# Patient Record
Sex: FEMALE | Race: AMERICAN INDIAN OR ALASKA NATIVE
[De-identification: names, ages, dates, MRNs, and addresses within clinical notes are randomized per-mention and may not be internally consistent; named-entity substitution may affect disease eponyms.]

---

## 2018-06-22 ENCOUNTER — HOSPITAL ENCOUNTER (OUTPATIENT)
Dept: HOSPITAL 5 - SPVWC | Age: 68
Discharge: HOME | End: 2018-06-22
Attending: FAMILY MEDICINE
Payer: COMMERCIAL

## 2018-06-22 DIAGNOSIS — Z13.820: Primary | ICD-10-CM

## 2018-06-22 DIAGNOSIS — F17.210: ICD-10-CM

## 2018-06-22 DIAGNOSIS — Z90.710: ICD-10-CM

## 2018-06-22 DIAGNOSIS — E13.9: ICD-10-CM

## 2018-06-22 DIAGNOSIS — Z82.49: ICD-10-CM

## 2018-06-22 DIAGNOSIS — Z78.0: ICD-10-CM

## 2018-06-22 DIAGNOSIS — I10: ICD-10-CM

## 2018-06-22 DIAGNOSIS — Z83.3: ICD-10-CM

## 2018-06-22 DIAGNOSIS — E78.00: ICD-10-CM

## 2018-06-22 DIAGNOSIS — E07.9: ICD-10-CM

## 2018-06-22 PROCEDURE — 77080 DXA BONE DENSITY AXIAL: CPT

## 2018-06-22 NOTE — MAMMOGRAPHY REPORT
BONE DENSITY STUDY:



Postmenopausal osteoporosis screening.



DEFINITIONS:

  BMD     = Bone Mineral Density

  T-score = BMD related to mean peak bone mass of young adult

            (mean expressed in Standard Deviation)

  Z-score = Age matched BMD expressed in SD



World Health Organization (WHO) Diagnostic Criteria

  Normal             T-score > -1 SD

  Osteopenia      T-score between -1 and -2.4 SD

  Osteoporosis    T-score -2.5 SD or below



FINDINGS:

The weighted average BMD of lumbar spine L1-L4 is 1.199 with a T-score 

of 0.4.



The weighted average BMD of the left hip is 0.926 with a T-score of 

-0.7. The femoral neck BMD is 0.760 with a T. value score of -1.3.



Compared to her prior examination in 2015 there is generalized 

improvement in the bone density of both the lumbar spine and left hip.



IMPRESSION:

The patient's average T-score is diagnostic for normal bone density and

low relative risk for fracture.



NOTE:

BMD is not the only risk factor for fracture; also consider

factors such as the patient's age, risk of falling, previous

osteoporotic fracture, family history of osteoporotic fractures, 

current smoker, and low body weight.



Pacheco's triangle is a region of interest in femur, predominantly

of trabecular bone.  It is not a true anatomic site, and [facility] does not 

recommend its use clinically.